# Patient Record
(demographics unavailable — no encounter records)

---

## 2025-03-25 NOTE — REVIEW OF SYSTEMS
[de-identified] : as per HPI  [de-identified] : as per HPI  [de-identified] : as per HPI  [FreeTextEntry2] : as per HPI

## 2025-03-25 NOTE — HISTORY OF PRESENT ILLNESS
[de-identified] : 3 month old boy presents for initial evaluation for pediatric feeding disorder  Born at term No  complications PMH PSH RX ALL hx normal  Difficulty latching Difficulty breast feeding gaining weight  doing well otherwise ROS negative